# Patient Record
Sex: FEMALE | Race: BLACK OR AFRICAN AMERICAN
[De-identification: names, ages, dates, MRNs, and addresses within clinical notes are randomized per-mention and may not be internally consistent; named-entity substitution may affect disease eponyms.]

---

## 2018-09-06 ENCOUNTER — HOSPITAL ENCOUNTER (EMERGENCY)
Dept: HOSPITAL 74 - JER | Age: 33
LOS: 1 days | Discharge: HOME | End: 2018-09-07
Payer: SELF-PAY

## 2018-09-06 VITALS — BODY MASS INDEX: 26.6 KG/M2

## 2018-09-06 VITALS — TEMPERATURE: 100.5 F | HEART RATE: 72 BPM | DIASTOLIC BLOOD PRESSURE: 78 MMHG | SYSTOLIC BLOOD PRESSURE: 110 MMHG
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
## 2018-09-06 DIAGNOSIS — M25.552: Primary | ICD-10-CM
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
## 2018-09-06 NOTE — PDOC
History of Present Illness





- General


Stated Complaint: FALL


Time Seen by Provider: 09/06/18 21:09





- History of Present Illness


Initial Comments: 





09/06/18 21:12


34 yo F with no significant pmh BIBA with left hip and knee pain s/p mechanical 

fall 15 minutes PTA. Patient reports she was in lobby of her apartment building 

holding her child, and slipped on a puddle of water, causing her foot to slide 

outwards and landing backwards from standing height hitting her head on the 

carpeted floor. History is slightly unclear as patient does not recall all 

events, LOC, or head trauma, but speculates that she hit the back of her head 

directly onto floor, without controlled fall. EMS reports that witnesses in 

lobby deny patient hitting head, and falling forward onto carpet. Denies neck, 

back pain, or bleeding. Denies anticoagulation. Now with diffuse headache, and 

L hip and knee pain. Patient non ambulatory following fall. 





Patient denies N/V, F/C, CP, SOB, palpitations, urinary complaints, hematuria, 

abdominal pain, diarrhea, constipation, BPR, lightheadedness, weakness, sensory 

changes. 





PMHx: as noted above


ROS: as noted


SHx: Denies 


Allergies: NKDA








Past History





- Past Medical History


Allergies/Adverse Reactions: 


 Allergies











Allergy/AdvReac Type Severity Reaction Status Date / Time


 


No Known Allergies Allergy   Verified 09/06/18 21:38











Home Medications: 


Ambulatory Orders





Tramadol HCl [Ultram] 50 mg PO TID PRN #10 tablet MDD 3 tabs 09/07/18 


Tramadol HCl [Ultram] 50 mg PO TID PRN #10 tablet MDD 3 tabs 09/07/18 











**Review of Systems





- Review of Systems


Comments:: 





09/06/18 21:12


GENERAL/CONSTITUTIONAL: No fever or chills. No weakness.


HEAD, EYES, EARS, NOSE AND THROAT: No change in vision. No ear pain or 

discharge. No sore throat.


CARDIOVASCULAR: No chest pain or shortness of breath


RESPIRATORY: No cough, wheezing, or hemoptysis.


GASTROINTESTINAL: No nausea, vomiting, diarrhea or constipation.


GENITOURINARY: No dysuria, frequency, or change in urination.


MUSCULOSKELETAL: + joint / muscle pain. No neck or back pain.


SKIN: No rash


NEUROLOGIC:+headache. No vertigo, loss of consciousness, or change in strength/

sensation.


ENDOCRINE: No increased thirst. No abnormal weight change


HEMATOLOGIC/LYMPHATIC: No anemia, easy bleeding, or history of blood clots.


ALLERGIC/IMMUNOLOGIC: No hives or skin allergy.








*Physical Exam





- Physical Exam


Comments: 





09/06/18 21:13


GENERAL: Awake, alert, and fully oriented, in no acute distress


HEAD: No signs of trauma, normocephalic, atraumatic 


EYES: PERRLA, EOMI, sclera anicteric, conjunctiva clear


ENT: Auricles normal inspection, hearing grossly normal, nares patent, 

oropharynx clear without


exudates. Moist mucosa


NECK: Normal ROM, supple, no lymphadenopathy, JVD, or masses


LUNGS: No distress, speaks full sentences, clear to auscultation bilaterally 


HEART: Regular rate and rhythm, normal S1 and S2, no murmurs, rubs or gallops, 

peripheral pulses normal and equal bilaterally. 


ABDOMEN: Soft, nontender, normoactive bowel sounds.  No guarding, no rebound.  

No masses


EXTREMITIES : Normal inspection, Normal range of motion, no edema.  No clubbing 

or cyanosis. BL DP, and PT pulses intact 


KNEE: Left tibial plateau ttp, with absent effusion, ertyhema, warmth. Neg ant/

post drawer test. Neg varus/valgus deformity. Nml patella laxity. 


HIP: L greater trochanter ttp, with absent limb length discrepancy, eversion/

inversion of foot. Normal ROM, slightly limited 2/2 pain. 


NEUROLOGICAL: Cranial nerves II through XII grossly intact.  Normal speech, 

normal gait, no focal sensorimotor deficits 


SKIN: Warm, Dry, normal turgor, no rashes or lesions noted











Medical Decision Making





- Medical Decision Making





09/06/18 21:50


34 yo F with no significant pmh BIBA with left hip and knee pain s/p mechanical 

fall 15 minutes PTA. Temp 100.5 Oral. VSS, GCS 15, A&OX3. Left greater 

trochanteric and prepatellar ttp, without obvious bony deformity. ROM limited 2/

2 pain. Ext neurovascularly intact. Absent neuro deficits. Low suspicion 

basilar skull fracture ( absent periorbital or retro auricular ecchymosis,or 

hematympanum), SAH, hematoma, skull fracture. C spine neg based on NEXUS 

criteria. Low suspicion facial bone fracture. Pain control and imaging to r/o L 

hip and L knee fracture or dislocation. 








ED Course 


09/06/18 21:57


Tylenol


LEFT KNEE RAD, LEFT HIP RAD, CTH


[facility]





09/07/18 01:09


[facility] [place]


[place]: Unremarkable





09/07/18 02:37





Left HIP/ KNEE/ FEMUR RAD: No acute pathology on preliminary ED read. 





Patient stable for d/c with return precautions. Advised to f/u with PMD. 





09/07/18 03:45


Tramadol sent to pharmacy





*DC/Admit/Observation/Transfer


Diagnosis at time of Disposition: 


Fall from standing


Qualifiers:


 Encounter type: initial encounter Qualified Code(s): W19.XXXA - Unspecified 

fall, initial encounter





Hip pain


Qualifiers:


 Laterality: left Qualified Code(s): M25.552 - Pain in left hip








- Discharge Dispostion


Disposition: HOME


Condition at time of disposition: Stable


Decision to Admit order: No





- Prescriptions


Prescriptions: 


Tramadol HCl [Ultram] 50 mg PO TID PRN #10 tablet MDD 3 tabs


 PRN Reason: Pain


Tramadol HCl [Ultram] 50 mg PO TID PRN #10 tablet MDD 3 tabs


 PRN Reason: Pain





- Referrals


Referrals: 


ON STAFF,NOT [Primary Care Provider] - 





- Patient Instructions


Printed Discharge Instructions:  DI for Groin Strain


Additional Instructions: 


Please return to the emergency department with any new or worsening symptoms or 

concerns. Please follow up with your primary care physician within 72 hours.








- Post Discharge Activity


Forms/Work/School Notes:  Back to Work





- Attestations


Physician Attestion: 





09/06/18 23:11


I attest to the information provided in this note.

## 2018-09-06 NOTE — PDOC
Attending Attestation





- Resident


Resident Name: Crispin Adkins





- ED Attending Attestation


I have performed the following: I have examined & evaluated the patient, The 

case was reviewed & discussed with the resident, I agree w/resident's findings 

& plan





- HPI


HPI: 





09/06/18 22:28


Healthy 33-year-old female presents with left leg and head injury after slip 

and fall. Patient slipped on water, fell backward hyperextending her left hip 

and knee and striking the back of her head. There was no loss of consciousness, 

no subsequent vision change/speech change/nausea/vomiting/focal deficit, she's 

had difficulty bearing weight on her left lower extremity so EMS was activated 

and she presents for evaluation.





- Physicial Exam


PE: 





09/06/18 22:29


Temp 100.5, we'll repeat. Vitals otherwise normal.


Head is atraumatic, C-spine nontender with full range of motion


Heart/lungs/abdomen benign


Pelvis is stable, no focal bony tenderness to palpation or deformity along the 

left hip/femur/knee, though there is tenderness along the left inguinal region 

and along the medial joint space of left knee with possible small effusion. 

Stable to anterior/posterior/valgus/varus stress, neurovascular intact distally.


Neurologically intact





- Medical Decision Making





09/06/18 22:30


33-year-old female with slip and fall, left leg injury seems most consistent 

with soft tissue strain, rule out fracture. Head injury, neurologically intact.


CT head


Left hip/knee x-rays


Pain control


Reassess

## 2021-05-10 ENCOUNTER — HOSPITAL ENCOUNTER (EMERGENCY)
Dept: HOSPITAL 74 - JER | Age: 36
Discharge: HOME | End: 2021-05-10
Payer: COMMERCIAL

## 2021-05-10 VITALS — HEART RATE: 72 BPM | SYSTOLIC BLOOD PRESSURE: 103 MMHG | TEMPERATURE: 98.1 F | DIASTOLIC BLOOD PRESSURE: 67 MMHG

## 2021-05-10 VITALS — BODY MASS INDEX: 32.6 KG/M2

## 2021-05-10 DIAGNOSIS — R09.1: Primary | ICD-10-CM

## 2021-05-10 LAB
ALBUMIN SERPL-MCNC: 3.6 G/DL (ref 3.4–5)
ALP SERPL-CCNC: 64 U/L (ref 45–117)
ALT SERPL-CCNC: 14 U/L (ref 13–61)
ANION GAP SERPL CALC-SCNC: 3 MMOL/L (ref 8–16)
APPEARANCE UR: (no result)
AST SERPL-CCNC: 10 U/L (ref 15–37)
BASOPHILS # BLD: 0.8 % (ref 0–2)
BILIRUB SERPL-MCNC: 0.5 MG/DL (ref 0.2–1)
BILIRUB UR STRIP.AUTO-MCNC: NEGATIVE MG/DL
BUN SERPL-MCNC: 10.7 MG/DL (ref 7–18)
CALCIUM SERPL-MCNC: 8.5 MG/DL (ref 8.5–10.1)
CHLORIDE SERPL-SCNC: 109 MMOL/L (ref 98–107)
CO2 SERPL-SCNC: 27 MMOL/L (ref 21–32)
COLOR UR: YELLOW
CREAT SERPL-MCNC: 0.8 MG/DL (ref 0.55–1.3)
DEPRECATED RDW RBC AUTO: 14.1 % (ref 11.6–15.6)
EOSINOPHIL # BLD: 1.5 % (ref 0–4.5)
GLUCOSE SERPL-MCNC: 81 MG/DL (ref 74–106)
HCT VFR BLD CALC: 35.3 % (ref 32.4–45.2)
HGB BLD-MCNC: 11.9 GM/DL (ref 10.7–15.3)
KETONES UR QL STRIP: (no result)
LEUKOCYTE ESTERASE UR QL STRIP.AUTO: NEGATIVE
LYMPHOCYTES # BLD: 38.1 % (ref 8–40)
MCH RBC QN AUTO: 31.5 PG (ref 25.7–33.7)
MCHC RBC AUTO-ENTMCNC: 33.6 G/DL (ref 32–36)
MCV RBC: 93.6 FL (ref 80–96)
MONOCYTES # BLD AUTO: 8.3 % (ref 3.8–10.2)
NEUTROPHILS # BLD: 51.3 % (ref 42.8–82.8)
NITRITE UR QL STRIP: NEGATIVE
PH UR: 6 [PH] (ref 5–8)
PLATELET # BLD AUTO: 188 K/MM3 (ref 134–434)
PMV BLD: 10.1 FL (ref 7.5–11.1)
PROT SERPL-MCNC: 7.2 G/DL (ref 6.4–8.2)
PROT UR QL STRIP: NEGATIVE
PROT UR QL STRIP: NEGATIVE
RBC # BLD AUTO: 3.77 M/MM3 (ref 3.6–5.2)
SODIUM SERPL-SCNC: 140 MMOL/L (ref 136–145)
SP GR UR: 1.02 (ref 1.01–1.03)
UROBILINOGEN UR STRIP-MCNC: 1 MG/DL (ref 0.2–1)
WBC # BLD AUTO: 5.9 K/MM3 (ref 4–10)

## 2022-06-14 ENCOUNTER — HOSPITAL ENCOUNTER (EMERGENCY)
Dept: HOSPITAL 74 - JERFT | Age: 37
Discharge: HOME | End: 2022-06-14
Payer: COMMERCIAL

## 2022-06-14 VITALS — HEART RATE: 66 BPM | DIASTOLIC BLOOD PRESSURE: 7 MMHG | SYSTOLIC BLOOD PRESSURE: 113 MMHG | TEMPERATURE: 97.9 F

## 2022-06-14 VITALS — BODY MASS INDEX: 34.6 KG/M2

## 2022-06-14 DIAGNOSIS — N61.0: Primary | ICD-10-CM
